# Patient Record
Sex: MALE | Race: BLACK OR AFRICAN AMERICAN | NOT HISPANIC OR LATINO
[De-identification: names, ages, dates, MRNs, and addresses within clinical notes are randomized per-mention and may not be internally consistent; named-entity substitution may affect disease eponyms.]

---

## 2021-10-12 ENCOUNTER — NON-APPOINTMENT (OUTPATIENT)
Age: 71
End: 2021-10-12

## 2021-10-12 ENCOUNTER — APPOINTMENT (OUTPATIENT)
Dept: OPHTHALMOLOGY | Facility: CLINIC | Age: 71
End: 2021-10-12
Payer: COMMERCIAL

## 2021-10-12 PROCEDURE — 92004 COMPRE OPH EXAM NEW PT 1/>: CPT

## 2021-10-12 PROCEDURE — 92134 CPTRZ OPH DX IMG PST SGM RTA: CPT

## 2021-10-12 PROCEDURE — 92015 DETERMINE REFRACTIVE STATE: CPT

## 2023-04-13 PROBLEM — K63.5 COLON POLYP: Status: ACTIVE | Noted: 2023-04-13

## 2023-04-13 PROBLEM — K64.8 INTERNAL HEMORRHOIDS: Status: ACTIVE | Noted: 2023-04-13

## 2023-04-14 ENCOUNTER — APPOINTMENT (OUTPATIENT)
Dept: SURGERY | Facility: CLINIC | Age: 73
End: 2023-04-14
Payer: MEDICARE

## 2023-04-14 VITALS
WEIGHT: 195 LBS | BODY MASS INDEX: 27.92 KG/M2 | HEART RATE: 59 BPM | DIASTOLIC BLOOD PRESSURE: 71 MMHG | RESPIRATION RATE: 18 BRPM | TEMPERATURE: 98.3 F | HEIGHT: 70 IN | OXYGEN SATURATION: 98 % | SYSTOLIC BLOOD PRESSURE: 119 MMHG

## 2023-04-14 DIAGNOSIS — K64.8 OTHER HEMORRHOIDS: ICD-10-CM

## 2023-04-14 DIAGNOSIS — K63.5 POLYP OF COLON: ICD-10-CM

## 2023-04-14 PROCEDURE — 99204 OFFICE O/P NEW MOD 45 MIN: CPT

## 2023-04-14 NOTE — HISTORY OF PRESENT ILLNESS
[FreeTextEntry1] : Juan is a 73yr. old male is here for consultation for colon mass\par \par Colonoscopy 4/10/23 by Dr. Chapman -  1.Retroflex view rectum : Internal hemorrhoids. 2. Rectum : Diminutive sessile polyp, removed with forceps. 3. Sigmoid : Pedunculated polyp removed with cautery snare < 1cm @20cm diminutive sessile/flat polyp distal to that removed with forceps. 4. Descending colon : Mass  nearly circumferential, friable,malignant appearing with firm area. Multiple biopsies taken. Unable to  visualize proximal to the left colon mass which was at 35cm\par \par Pathology pending\par \par Today pt reports feeling no pain.  No BM after colonoscopy until today after taking the enema, his regular BMs was daily once formed, occasional straining, reports recent changed of bowel habits less frequent, no bleeding.  Endorses has h/o of anal swelling, no itchiness for 3 years uses Anusol cream with relief.  No episodes of incontinence of stool or flatus.  Intentional 10 lbs weight loss.  Good appetite.  No c/o nausea/vomiting.  Denies fever and chills.  Not on anticoagulants.  No abdominal surgery history, no family history of colon cancer.  \par

## 2023-04-14 NOTE — ASSESSMENT
[FreeTextEntry1] : In summary the patient has a newly diagnosed likely malignant mass of the sigmoid colon at 35 cm on colonoscopy.  There was also a synchronous polyp which was removed.  Pathology is pending.  I ordered a CT chest abdomen pelvis/virtual colonoscopy to evaluate for synchronous lesions in the proximal colon and to evaluate for metastatic disease.  CEA is 2.5.  He will likely require laparoscopic assisted sigmoid colectomy.  I explained the risks benefits and alternatives of surgery including the risks of bleeding infection anastomotic leakage the possible need for an open procedure and rare incidence of the need for temporary stoma.  I explained that final plan will be made after his imaging studies have been completed.

## 2023-04-14 NOTE — PHYSICAL EXAM
[Normal Breath Sounds] : Normal breath sounds [Normal Heart Sounds] : normal heart sounds [No Rash or Lesion] : No rash or lesion [Alert] : alert [Oriented to Person] : oriented to person [Oriented to Place] : oriented to place [Oriented to Time] : oriented to time [Calm] : calm [No HSM] : no hepatosplenomegaly [de-identified] : Soft, nontender, moderate diastases recti.  No palpable masses or hernias. [de-identified] : Digital rectal examination reveals no palpable mass. [de-identified] : WNL [de-identified] : WNL [de-identified] : ERINL [de-identified] : WNL ROM [de-identified] : WNL

## 2023-04-14 NOTE — CONSULT LETTER
[Dear  ___] : Dear  [unfilled], [Consult Letter:] : I had the pleasure of evaluating your patient, [unfilled]. [Please see my note below.] : Please see my note below. [Consult Closing:] : Thank you very much for allowing me to participate in the care of this patient.  If you have any questions, please do not hesitate to contact me. [Sincerely,] : Sincerely, [FreeTextEntry3] : Dannie Souza M.D., F.A.C.S, F.A.S.C.R.S [DrReggie  ___] : Dr. SOUSA

## 2023-04-21 ENCOUNTER — APPOINTMENT (OUTPATIENT)
Dept: CT IMAGING | Facility: CLINIC | Age: 73
End: 2023-04-21
Payer: MEDICARE

## 2023-04-21 ENCOUNTER — NON-APPOINTMENT (OUTPATIENT)
Age: 73
End: 2023-04-21

## 2023-04-21 ENCOUNTER — OUTPATIENT (OUTPATIENT)
Dept: OUTPATIENT SERVICES | Facility: HOSPITAL | Age: 73
LOS: 1 days | End: 2023-04-21
Payer: MEDICARE

## 2023-04-21 DIAGNOSIS — K63.5 POLYP OF COLON: ICD-10-CM

## 2023-04-21 PROCEDURE — 71260 CT THORAX DX C+: CPT | Mod: 26

## 2023-04-27 ENCOUNTER — APPOINTMENT (OUTPATIENT)
Dept: GASTROENTEROLOGY | Facility: CLINIC | Age: 73
End: 2023-04-27

## 2023-04-28 ENCOUNTER — NON-APPOINTMENT (OUTPATIENT)
Age: 73
End: 2023-04-28

## 2023-04-28 ENCOUNTER — APPOINTMENT (OUTPATIENT)
Dept: CT IMAGING | Facility: CLINIC | Age: 73
End: 2023-04-28
Payer: MEDICARE

## 2023-04-28 ENCOUNTER — OUTPATIENT (OUTPATIENT)
Dept: OUTPATIENT SERVICES | Facility: HOSPITAL | Age: 73
LOS: 1 days | End: 2023-04-28
Payer: MEDICARE

## 2023-04-28 DIAGNOSIS — K63.5 POLYP OF COLON: ICD-10-CM

## 2023-04-28 DIAGNOSIS — K63.89 OTHER SPECIFIED DISEASES OF INTESTINE: ICD-10-CM

## 2023-04-28 PROCEDURE — 74263 CT COLONOGRAPHY SCREENING: CPT | Mod: 26

## 2023-05-18 ENCOUNTER — APPOINTMENT (OUTPATIENT)
Dept: SURGERY | Facility: HOSPITAL | Age: 73
End: 2023-05-18

## 2023-05-18 ENCOUNTER — NON-APPOINTMENT (OUTPATIENT)
Age: 73
End: 2023-05-18

## 2023-05-25 ENCOUNTER — APPOINTMENT (OUTPATIENT)
Dept: SURGERY | Facility: HOSPITAL | Age: 73
End: 2023-05-25

## 2024-04-30 ENCOUNTER — APPOINTMENT (OUTPATIENT)
Dept: CT IMAGING | Facility: CLINIC | Age: 74
End: 2024-04-30
Payer: MEDICARE

## 2024-04-30 PROCEDURE — 71260 CT THORAX DX C+: CPT

## 2024-04-30 PROCEDURE — 74177 CT ABD & PELVIS W/CONTRAST: CPT

## 2024-08-26 ENCOUNTER — APPOINTMENT (OUTPATIENT)
Dept: OPHTHALMOLOGY | Facility: CLINIC | Age: 74
End: 2024-08-26
Payer: MEDICARE

## 2024-08-26 ENCOUNTER — NON-APPOINTMENT (OUTPATIENT)
Age: 74
End: 2024-08-26

## 2024-08-26 PROCEDURE — 92015 DETERMINE REFRACTIVE STATE: CPT

## 2024-08-26 PROCEDURE — 92014 COMPRE OPH EXAM EST PT 1/>: CPT

## 2024-08-26 PROCEDURE — 92134 CPTRZ OPH DX IMG PST SGM RTA: CPT

## 2024-09-13 ENCOUNTER — APPOINTMENT (OUTPATIENT)
Dept: CT IMAGING | Facility: CLINIC | Age: 74
End: 2024-09-13

## 2024-09-13 PROCEDURE — 74177 CT ABD & PELVIS W/CONTRAST: CPT

## 2024-09-13 PROCEDURE — 71260 CT THORAX DX C+: CPT

## 2025-03-21 ENCOUNTER — APPOINTMENT (OUTPATIENT)
Dept: CT IMAGING | Facility: CLINIC | Age: 75
End: 2025-03-21
Payer: MEDICARE

## 2025-03-21 PROCEDURE — 74177 CT ABD & PELVIS W/CONTRAST: CPT

## 2025-07-17 ENCOUNTER — APPOINTMENT (OUTPATIENT)
Dept: UROLOGY | Facility: CLINIC | Age: 75
End: 2025-07-17
Payer: MEDICARE

## 2025-07-17 ENCOUNTER — NON-APPOINTMENT (OUTPATIENT)
Age: 75
End: 2025-07-17

## 2025-07-17 VITALS
HEIGHT: 70 IN | DIASTOLIC BLOOD PRESSURE: 69 MMHG | SYSTOLIC BLOOD PRESSURE: 116 MMHG | HEART RATE: 55 BPM | BODY MASS INDEX: 27.2 KG/M2 | WEIGHT: 190 LBS | OXYGEN SATURATION: 98 %

## 2025-07-17 PROBLEM — Z85.038 HISTORY OF MALIGNANT NEOPLASM OF COLON: Status: RESOLVED | Noted: 2025-07-17 | Resolved: 2025-07-17

## 2025-07-17 PROBLEM — N40.0 BPH (BENIGN PROSTATIC HYPERPLASIA): Status: ACTIVE | Noted: 2025-07-17

## 2025-07-17 PROBLEM — R97.20 ELEVATED PSA: Status: ACTIVE | Noted: 2025-07-17

## 2025-07-17 PROBLEM — Z86.39 HISTORY OF DIABETES MELLITUS: Status: RESOLVED | Noted: 2025-07-17 | Resolved: 2025-07-17

## 2025-07-17 PROCEDURE — G2211 COMPLEX E/M VISIT ADD ON: CPT

## 2025-07-17 PROCEDURE — 99204 OFFICE O/P NEW MOD 45 MIN: CPT

## 2025-07-17 RX ORDER — SODIUM PHOSPHATE, DIBASIC AND SODIUM PHOSPHATE, MONOBASIC 7; 19 G/230ML; G/230ML
ENEMA RECTAL
Qty: 1 | Refills: 0 | Status: ACTIVE | COMMUNITY
Start: 2025-07-17 | End: 1900-01-01

## 2025-07-17 RX ORDER — SITAGLIPTIN AND METFORMIN HYDROCHLORIDE 50; 500 MG/1; MG/1
50-500 TABLET, FILM COATED ORAL
Refills: 0 | Status: ACTIVE | COMMUNITY

## 2025-07-17 RX ORDER — EMPAGLIFLOZIN 10 MG/1
10 TABLET, FILM COATED ORAL
Refills: 0 | Status: ACTIVE | COMMUNITY

## 2025-07-20 LAB
ANION GAP SERPL CALC-SCNC: 13 MMOL/L
BUN SERPL-MCNC: 18 MG/DL
CALCIUM SERPL-MCNC: 9.8 MG/DL
CHLORIDE SERPL-SCNC: 109 MMOL/L
CO2 SERPL-SCNC: 23 MMOL/L
CREAT SERPL-MCNC: 1 MG/DL
EGFRCR SERPLBLD CKD-EPI 2021: 78 ML/MIN/1.73M2
GLUCOSE SERPL-MCNC: 82 MG/DL
POTASSIUM SERPL-SCNC: 3.8 MMOL/L
PSA FREE FLD-MCNC: 25 %
PSA FREE SERPL-MCNC: 1.43 NG/ML
PSA SERPL-MCNC: 5.64 NG/ML
SODIUM SERPL-SCNC: 145 MMOL/L

## 2025-07-25 ENCOUNTER — NON-APPOINTMENT (OUTPATIENT)
Age: 75
End: 2025-07-25

## 2025-07-31 ENCOUNTER — APPOINTMENT (OUTPATIENT)
Dept: MRI IMAGING | Facility: IMAGING CENTER | Age: 75
End: 2025-07-31
Payer: MEDICARE

## 2025-07-31 ENCOUNTER — OUTPATIENT (OUTPATIENT)
Dept: OUTPATIENT SERVICES | Facility: HOSPITAL | Age: 75
LOS: 1 days | End: 2025-07-31
Payer: MEDICARE

## 2025-07-31 ENCOUNTER — RESULT REVIEW (OUTPATIENT)
Age: 75
End: 2025-07-31

## 2025-07-31 DIAGNOSIS — R97.20 ELEVATED PROSTATE SPECIFIC ANTIGEN [PSA]: ICD-10-CM

## 2025-07-31 PROCEDURE — 76498P: CUSTOM | Mod: 26

## 2025-07-31 PROCEDURE — 72197 MRI PELVIS W/O & W/DYE: CPT | Mod: 26

## 2025-08-01 ENCOUNTER — NON-APPOINTMENT (OUTPATIENT)
Age: 75
End: 2025-08-01

## 2025-08-08 ENCOUNTER — OUTPATIENT (OUTPATIENT)
Dept: OUTPATIENT SERVICES | Facility: HOSPITAL | Age: 75
LOS: 1 days | End: 2025-08-08
Payer: MEDICARE

## 2025-08-08 ENCOUNTER — RESULT REVIEW (OUTPATIENT)
Age: 75
End: 2025-08-08

## 2025-08-08 DIAGNOSIS — R97.20 ELEVATED PROSTATE SPECIFIC ANTIGEN [PSA]: ICD-10-CM

## 2025-08-08 PROCEDURE — C8001: CPT

## 2025-08-08 PROCEDURE — 76377 3D RENDER W/INTRP POSTPROCES: CPT | Mod: 26

## 2025-08-22 ENCOUNTER — NON-APPOINTMENT (OUTPATIENT)
Age: 75
End: 2025-08-22

## 2025-08-26 ENCOUNTER — NON-APPOINTMENT (OUTPATIENT)
Age: 75
End: 2025-08-26

## 2025-09-15 ENCOUNTER — APPOINTMENT (OUTPATIENT)
Dept: UROLOGY | Facility: CLINIC | Age: 75
End: 2025-09-15
Payer: MEDICARE

## 2025-09-15 VITALS — OXYGEN SATURATION: 99 % | SYSTOLIC BLOOD PRESSURE: 132 MMHG | HEART RATE: 57 BPM | DIASTOLIC BLOOD PRESSURE: 76 MMHG

## 2025-09-15 VITALS — SYSTOLIC BLOOD PRESSURE: 125 MMHG | HEART RATE: 71 BPM | OXYGEN SATURATION: 97 % | DIASTOLIC BLOOD PRESSURE: 74 MMHG

## 2025-09-15 DIAGNOSIS — K63.5 POLYP OF COLON: ICD-10-CM

## 2025-09-15 DIAGNOSIS — R97.20 ELEVATED PROSTATE, SPECIFIC ANTIGEN [PSA]: ICD-10-CM

## 2025-09-15 PROCEDURE — 55700: CPT

## 2025-09-15 PROCEDURE — 76999F: CUSTOM | Mod: 26

## 2025-09-22 LAB — PROSTATE BIOPSY: NORMAL
